# Patient Record
Sex: FEMALE | Race: WHITE | NOT HISPANIC OR LATINO
[De-identification: names, ages, dates, MRNs, and addresses within clinical notes are randomized per-mention and may not be internally consistent; named-entity substitution may affect disease eponyms.]

---

## 2018-12-23 ENCOUNTER — RX RENEWAL (OUTPATIENT)
Age: 83
End: 2018-12-23

## 2019-02-07 ENCOUNTER — RX RENEWAL (OUTPATIENT)
Age: 84
End: 2019-02-07

## 2019-02-11 ENCOUNTER — RX RENEWAL (OUTPATIENT)
Age: 84
End: 2019-02-11

## 2019-02-11 RX ORDER — ATORVASTATIN CALCIUM 10 MG/1
10 TABLET, FILM COATED ORAL
Qty: 90 | Refills: 0 | Status: ACTIVE | COMMUNITY
Start: 2019-02-07 | End: 1900-01-01

## 2019-04-03 ENCOUNTER — RX RENEWAL (OUTPATIENT)
Age: 84
End: 2019-04-03

## 2019-04-08 ENCOUNTER — RX CHANGE (OUTPATIENT)
Age: 84
End: 2019-04-08

## 2019-04-08 ENCOUNTER — RX RENEWAL (OUTPATIENT)
Age: 84
End: 2019-04-08

## 2019-06-05 ENCOUNTER — RECORD ABSTRACTING (OUTPATIENT)
Age: 84
End: 2019-06-05

## 2019-06-05 DIAGNOSIS — Z80.9 FAMILY HISTORY OF MALIGNANT NEOPLASM, UNSPECIFIED: ICD-10-CM

## 2019-06-05 DIAGNOSIS — G25.3 MYOCLONUS: ICD-10-CM

## 2019-06-05 DIAGNOSIS — Z80.0 FAMILY HISTORY OF MALIGNANT NEOPLASM OF DIGESTIVE ORGANS: ICD-10-CM

## 2019-06-05 DIAGNOSIS — Z86.79 PERSONAL HISTORY OF OTHER DISEASES OF THE CIRCULATORY SYSTEM: ICD-10-CM

## 2019-06-05 LAB — CYTOLOGY CVX/VAG DOC THIN PREP: NORMAL

## 2019-06-05 RX ORDER — VITAMIN E 268 MG
500 CAPSULE ORAL
Refills: 0 | Status: ACTIVE | COMMUNITY

## 2019-06-05 RX ORDER — CELECOXIB 100 MG/1
100 CAPSULE ORAL
Refills: 0 | Status: ACTIVE | COMMUNITY

## 2019-06-05 RX ORDER — MULTIVITAMIN
TABLET ORAL
Refills: 0 | Status: ACTIVE | COMMUNITY

## 2019-06-05 RX ORDER — ASPIRIN ENTERIC COATED TABLETS 81 MG 81 MG/1
81 TABLET, DELAYED RELEASE ORAL
Refills: 0 | Status: ACTIVE | COMMUNITY

## 2019-07-01 ENCOUNTER — RX RENEWAL (OUTPATIENT)
Age: 84
End: 2019-07-01

## 2019-07-02 ENCOUNTER — APPOINTMENT (OUTPATIENT)
Dept: INTERNAL MEDICINE | Facility: CLINIC | Age: 84
End: 2019-07-02

## 2019-07-11 ENCOUNTER — RX RENEWAL (OUTPATIENT)
Age: 84
End: 2019-07-11

## 2019-08-20 ENCOUNTER — APPOINTMENT (OUTPATIENT)
Dept: ENDOCRINOLOGY | Facility: CLINIC | Age: 84
End: 2019-08-20
Payer: MEDICARE

## 2019-08-20 VITALS
DIASTOLIC BLOOD PRESSURE: 60 MMHG | BODY MASS INDEX: 26.75 KG/M2 | HEIGHT: 63 IN | SYSTOLIC BLOOD PRESSURE: 142 MMHG | WEIGHT: 151 LBS

## 2019-08-20 DIAGNOSIS — E53.8 DEFICIENCY OF OTHER SPECIFIED B GROUP VITAMINS: ICD-10-CM

## 2019-08-20 DIAGNOSIS — E53.1 PYRIDOXINE DEFICIENCY: ICD-10-CM

## 2019-08-20 DIAGNOSIS — D64.9 ANEMIA, UNSPECIFIED: ICD-10-CM

## 2019-08-20 PROCEDURE — 36415 COLL VENOUS BLD VENIPUNCTURE: CPT

## 2019-08-20 PROCEDURE — 99214 OFFICE O/P EST MOD 30 MIN: CPT | Mod: 25

## 2019-08-20 RX ORDER — METFORMIN HYDROCHLORIDE 1000 MG/1
1000 TABLET, COATED ORAL
Qty: 180 | Refills: 3 | Status: DISCONTINUED | COMMUNITY
Start: 2019-07-01 | End: 2019-08-20

## 2019-08-21 LAB
ALBUMIN SERPL ELPH-MCNC: 4.6 G/DL
ALP BLD-CCNC: 85 U/L
ALT SERPL-CCNC: 11 U/L
ANION GAP SERPL CALC-SCNC: 18 MMOL/L
AST SERPL-CCNC: 16 U/L
BASOPHILS # BLD AUTO: 0.06 K/UL
BASOPHILS NFR BLD AUTO: 0.6 %
BILIRUB DIRECT SERPL-MCNC: 0.1 MG/DL
BILIRUB INDIRECT SERPL-MCNC: 0.2 MG/DL
BILIRUB SERPL-MCNC: 0.3 MG/DL
BUN SERPL-MCNC: 30 MG/DL
CALCIT SERPL-MCNC: <1 PG/ML
CALCIUM SERPL-MCNC: 10.4 MG/DL
CALCIUM SERPL-MCNC: 10.4 MG/DL
CHLORIDE SERPL-SCNC: 100 MMOL/L
CO2 SERPL-SCNC: 26 MMOL/L
CREAT SERPL-MCNC: 1.6 MG/DL
EOSINOPHIL # BLD AUTO: 0.17 K/UL
EOSINOPHIL NFR BLD AUTO: 1.7 %
ESTIMATED AVERAGE GLUCOSE: 137 MG/DL
GLUCOSE SERPL-MCNC: 166 MG/DL
HBA1C MFR BLD HPLC: 6.4 %
HCT VFR BLD CALC: 38.7 %
HGB BLD-MCNC: 12.2 G/DL
IMM GRANULOCYTES NFR BLD AUTO: 0.3 %
LYMPHOCYTES # BLD AUTO: 1.7 K/UL
LYMPHOCYTES NFR BLD AUTO: 17 %
MAGNESIUM SERPL-MCNC: 2.2 MG/DL
MAN DIFF?: NORMAL
MCHC RBC-ENTMCNC: 31.4 PG
MCHC RBC-ENTMCNC: 31.5 GM/DL
MCV RBC AUTO: 99.5 FL
MONOCYTES # BLD AUTO: 0.59 K/UL
MONOCYTES NFR BLD AUTO: 5.9 %
NEUTROPHILS # BLD AUTO: 7.43 K/UL
NEUTROPHILS NFR BLD AUTO: 74.5 %
PARATHYROID HORMONE INTACT: 31 PG/ML
PHOSPHATE SERPL-MCNC: 3.9 MG/DL
PLATELET # BLD AUTO: 252 K/UL
POTASSIUM SERPL-SCNC: 4.2 MMOL/L
PROT SERPL-MCNC: 7.5 G/DL
RBC # BLD: 3.89 M/UL
RBC # FLD: 13.5 %
SODIUM SERPL-SCNC: 144 MMOL/L
T3 SERPL-MCNC: 79 NG/DL
T4 SERPL-MCNC: 7.6 UG/DL
TSH SERPL-ACNC: 2.82 UIU/ML
VIT B12 SERPL-MCNC: 803 PG/ML
WBC # FLD AUTO: 9.98 K/UL

## 2019-08-21 NOTE — HISTORY OF PRESENT ILLNESS
[FreeTextEntry1] : August 20, 2019\par \par    PCP: Dr. Yolanda Villa  <- Dr. Amado Ng\par             Kidney - Dr. Nehemiah Hughes at [facility] for elev. creat.\par             Gyn: Dr. Fay\par             Card: Dr. George Dunn ([place]) \par             [phone] (Office)\par             [phone] (Fax)\par             Neuro: Dr. Crook for tremors 4 herniated discs \par             had MRI brain as well\par            .\par            CC: Silent thyroiditis - 2013 (now euthyroid)\par             12/13/2014 u/s [facility] - no nodule\par             Grand daughter developed thyroid cancer. - Medullary    \par             Type 2 diabetes A1c 6.7% ~ 2009\par             Elevated calcium - ?resolved off of supps\par             (elev. creatinine - Dr. 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 \par Left with requirement for low dose levothyroxine:  25 mcg daily.\par \par Visits today regarding diabetes.  \par Taking metformin 500 mg ER, two daily\par \par Recently underwent TAVR at [facility] very successfully.  \par \par Has a meter at home, but never uses it.  \par \par Impression:  Mild hypothyrodiism controlled.\par Labs today will show if BS also controlled.  \par \par \par Previous notes from [employer] appended below.\par \par \par    July 11, 2018\par            .\par            PCP: Dr. Amado gN\par             Kidney - Dr. Nehemiah Hughes at [facility] for elev. creat.\par             Gyn: Dr. Fay\par             Card: Dr. George Dunn ([place]) \par             [phone] (Office)\par             [phone] (Fax)\par             Neuro: Dr. Crook for tremors 4 herniated discs \par             had MRI brain as well\par            .\par            CC: Silent thyroiditis - 2013 (now euthyroid)\par             12/13/2014 u/[facility] - no nodule\par             Grand daughter developed thyroid cancer.\par             Type 2 diabetes A1c 6.7%\par             Elevated calcium - ?resolved off of supplements\par             (elev. creatinine - Dr. Hughes)\par             (osteoarthritis)\par            .\par            On Synthroid 25 mcg daily and\par            metformin 1000 mg daily (Dougie Rudolph asked if ER better)\par            .\par            Kindly brings labs ([place]) from\par            Dr. George Dunn/Nuno Banks) [facility] at [address] [place] [address] in . phone: [phone] which include:\par            .\par            glucose 115 mg/dl\par            creatinine 1.44\par            calcium 10.1\par            HbA1c 6.1 %\par            .\par            She developed SOB, switched from HCTZ to furosemide and attributes this to improvement of A1c down to 6.1%\par            She also feels much improved on the furosemide. \par            .\par            Impression: Diabetes transitioned to prediabetes.\par            A1c excellent.\par            .\par            .\par            ==\par            .\par            December 12, 2017\par            .\par            PCP: Dr. Amado Ng\par             Kidney - Dr. Nehemiah Hughes at [facility]\par             Gyn: Dr. Fay\par             Card: Dr. George Dunn ([place]) \par             [phone] (Office)\par             [phone] (Fax)\par            .\par            CC: Silent thyroiditis - 2013 (now euthyroid)\par             Grand daughter developed thyroid cancer.\par             Type 2 diabetes\par             Elevated calcium - ?resolved off of supps\par             (elev. creatinine - Dr. Hughes)\par             (osteoarthritis)\par            .\par            Last here about 2 1/2 hears ago.\par            Last available blood tests ([facility]) from\par            4/29/2015\par            include\par            \par            cret1.53 \par            L shoulder replacement.\par            Herniated disc cervical spine\par            Twitching "like Dr. Barbosa" \par            -> gabapentin with resolution of the twitching.\par            Her main symptom today is fatigue.\par            She reports that she has been told of early aortic stenosis.\par            Recently switched from HCTZ to furosemide b/o LE edema.\par            .\par            81 yo accompanied by her friend, Dougie.\par            They will be traveling to [place] in the near future. \par            .\par            Says A1c 6.3 %\par            .\par            Impression: Gr 2/6 KE. No edema.\par            Thyroid normal to palpation. Ultrasound thyroid at [facility]\par            12/13/2014 showed "No nodule...hertogeneous echotexture...." \par            .\par            Plan: Updated TFTs today.\par            Call here for results in one-two weeks.\par            ROV one year. \par            .\par            .\par            ==\par            .\par            May 19, 2015\par            .\par            PCP: Dr. Amado Ng\par             Kidney - Dr. Nehemiah Hughes at [facility]\par             Gyn: Dr. Fay\par             Card: Dr. George Dunn ([place]) \par             [phone] (Office)\par             [phone] (Fax)\par            .\par            CC: Silent thyroiditis - 2013\par             DM2\par             Elevated calcium\par             (elev. creatinine - Dr. Hughes)\par             (osteoarthritis)\par            .\par            Friends with Dougie Rudolph who helps with planting and building\par            furniture. Returns today b/o history of thyroiditis and diabetes.\par            .\par            Plans R total knee at [facility] and would like cardiology evaluation before that. Has recently noted some SCHRADER.\par            .\par            Also notes increased fatigue and twitching.\par            Recent [facility] tests show\par            \par            calcium 9,5 **** off of supp\par            BUN 30\par            creatinine 1.53 (last one was 1.23)\par            B12 40\par            TSH 2.85 - on levothyroxine 25 mcg daily\par            25 OH vit D 31 \par            .\par            Dec 2014 Ultrasound thyroid - OK\par            .\par            Impression: BS in good range, thyroid in good range.\par            Elevated calcium has resolved off of supplements.\par            .\par            Plan: I told her that I agreed that cardiology evaluation is\par            prudent before next knee surgery.\par            .\par            ROV October after labs.\par            .\par            ==\par            .\par            ==\par            Dec 16, 2014\par            PCP: Dr. Ng\par             Kidney - Dr. Nehemiah Hughes at [facility]\par             Gyn: Dr. Fay\par            .\par            CC: Silent thyroiditis - 2013\par             DM2\par             Elevated calcium\par             (elev. creatinine - Dr. Hughes)\par             (osteoarthritis)\par            .\par            Husb a [employer] vet. \par            Former , but has herniated disk\par            Daughter in law recently had CVA\par            .\par            # TSH in August on levothyroxine 25 mcg/d normal at 3.0\par            .\par            # Calcium on Dec 10 normal at 9.6\par             PTH 27\par             1,25 di OH vit D 77.6 (10-75) \par             25 OH vit D 32. \par             ACE 31\par             Immunofixation blood - neg\par            .\par            # DM2 - Hemoglobine A1c 6.2% \par             Urine microalbumin normal a 3.3\par              mg/dl\par             Has meter at home for testing, fasting and after meals. \par            .\par            # Recent creatinine 1.32\par            .\par            Impression: All in all, doing very well. Hypothyroidism controlled.\par            .\par            Plan: Keep f/u appt to ophto - Dr. Izaguirre. No longer needs colonsocopies. \par            .\par            .\par            ====\par            Oct 15, 2014\par            PCP: Dr. Ng\par             Nehemiah Hughes at [facility] Nephrologist\par            CC: DM2\par             Silent thyroiditis\par             Elevated calcium\par             (elev creatinine - saw Dr. Hughes)\par            .\par            Blood tests done by Dr. Ng on August 26 showed\par             mg/dl\par            BUN 26\par            creatinine 1.35\par            TSH 3.0\par            HbA1c 6.2%\par            calcium 9.8 alb 4.2\par            .\par            Remains on levothyroxine 25 mcg daily.\par            .\par            Impression: Reason for elevated calcium not clear. \par             followed at [facility] for kidney cancer, Hep C (after transfusion).\par            .\par            ==\par            April 22, 2014\par            PCP: Dr. Ng\par            CC: DM2, thyroiditis, elevated calcium\par            .\par            .\par            Enjoyedd [place]\par            Will go to [facility]\par            CC: today = fatigue\par            .\par            Impression: Fingerstick BS highest at 176 mg/dl\par            On metformin 500 mg, two in PM per Dr. Sanders\par            FBS at lab 112 mg/dl and A1c 6.2%\par            .\par            Impresssion: Thyroiditis resolved. BS good.\par            Plan: F/U ultrasound thyroid October.\par            Continue weekly fingerstick BS\par            F/U to Dr. Ng.\par            .\par            .=========\par            March 4, 2014\par            PCP: Dr. Ng\par            CC: DM2; thyroiditis; elevated calcium\par            .\par            Had a good time in [place] and she and I chatted while she was there.\par            .\par            # She reports BS in good range, but does not test very often. Encouraged her to test at least once a week.\par            .\par            # Hyperthyroidism associated with very low uptake on scan, followed by hypothyroidism. Most recent TFTs are now back to normal. Continued surveillance and a f/u ultrasound thyroid will be prudent.\par            .\par            # Labs also show some elevation of calcium and elevated albumin ?volume depleted at time of test. Will follow up on calcium, albumin, BUN, creatinine before next visit.\par            .\par            =====\par            October 30, 2013\par            PCP: Dr. Ng\par            CC: DM2, hyperthyroid\par            .\par            Thyroid scan shows no uptake. T4 14\par            Imp: Thyroiditis.\par            Plan: PRN propranol; TFTs every month until return visit (she will be in [place] for a while).\par            .\par            =========\par            Oct 23, 2013\par            PCP: Dr. Amado Ng\par            CC: DM2, hyperthyroid\par            .\par            On Lamasil for fungal infection of nail, per a relative and has been advised to have LFTs checked.\par            Dr. Sanders had started her on metformin, which she still takes.\par            Went for sonogram of thyroid, small nodule.\par            Thyroid scan was supposed to be performed today, but I-123 was not available.\par            Imp: May well have thyroiditis.\par            Plan: For shakiness, propranolol 5 mg BID prn.\par            Scan rescheduled and ROV after that.\par            Continue to monitor some PC BS.\par            .\par            ========\par            October 17, 2013\par            PCP: Dr. 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 \par            # 2009 lost weight and was diagnosed with diabetes. Now takes metformin 1000 mg daily. Did well until April and Dr. Ng found that TSH was elevated. Now very fatigued. Sleeps more. \par            Saw Dr. Hughes at - for elevated creat\par            FHx thyroid cancer.\par            Imp: Went from hypo to hyperthyroid after L-thyroxine.\par            Plan: Labs/scan/sono ROV 1 week.

## 2019-08-25 LAB — VIT B6 SERPL-MCNC: 36.5 UG/L

## 2019-11-08 ENCOUNTER — APPOINTMENT (OUTPATIENT)
Dept: INTERNAL MEDICINE | Facility: CLINIC | Age: 84
End: 2019-11-08
Payer: MEDICARE

## 2019-11-08 VITALS
WEIGHT: 158 LBS | BODY MASS INDEX: 28 KG/M2 | HEART RATE: 68 BPM | DIASTOLIC BLOOD PRESSURE: 60 MMHG | TEMPERATURE: 98.7 F | SYSTOLIC BLOOD PRESSURE: 136 MMHG | HEIGHT: 63 IN

## 2019-11-08 DIAGNOSIS — M19.90 UNSPECIFIED OSTEOARTHRITIS, UNSPECIFIED SITE: ICD-10-CM

## 2019-11-08 DIAGNOSIS — Z86.39 PERSONAL HISTORY OF OTHER ENDOCRINE, NUTRITIONAL AND METABOLIC DISEASE: ICD-10-CM

## 2019-11-08 DIAGNOSIS — E78.5 HYPERLIPIDEMIA, UNSPECIFIED: ICD-10-CM

## 2019-11-08 PROCEDURE — G0439: CPT

## 2019-11-08 PROCEDURE — 90662 IIV NO PRSV INCREASED AG IM: CPT

## 2019-11-08 PROCEDURE — G0008: CPT

## 2019-11-08 RX ORDER — LEVOTHYROXINE SODIUM 25 UG/1
25 TABLET ORAL DAILY
Qty: 90 | Refills: 0 | Status: DISCONTINUED | COMMUNITY
Start: 2019-04-03 | End: 2019-11-08

## 2019-11-08 RX ORDER — PRIMIDONE 50 MG/1
50 TABLET ORAL
Refills: 0 | Status: DISCONTINUED | COMMUNITY
End: 2019-11-08

## 2019-11-08 RX ORDER — HYDROCHLOROTHIAZIDE AND TRIAMTERENE 25; 37.5 MG/1; MG/1
37.5-25 CAPSULE ORAL
Refills: 0 | Status: DISCONTINUED | COMMUNITY
End: 2019-11-08

## 2019-11-08 RX ORDER — SIMVASTATIN 10 MG/1
10 TABLET, FILM COATED ORAL
Refills: 0 | Status: DISCONTINUED | COMMUNITY
End: 2019-11-08

## 2019-11-08 RX ORDER — TRIAMTERENE AND HYDROCHLOROTHIAZIDE 25; 37.5 MG/1; MG/1
37.5-25 TABLET ORAL
Refills: 0 | Status: DISCONTINUED | COMMUNITY
End: 2019-11-08

## 2019-11-08 RX ORDER — LISINOPRIL 5 MG/1
5 TABLET ORAL
Refills: 0 | Status: DISCONTINUED | COMMUNITY
End: 2019-11-08

## 2019-11-08 RX ORDER — HYDROCODONE BITARTRATE AND ACETAMINOPHEN 5; 300 MG/1; MG/1
TABLET ORAL
Refills: 0 | Status: DISCONTINUED | COMMUNITY
End: 2019-11-08

## 2019-11-08 RX ORDER — LOSARTAN POTASSIUM 50 MG/1
50 TABLET, FILM COATED ORAL
Refills: 0 | Status: DISCONTINUED | COMMUNITY
End: 2019-11-08

## 2019-11-08 NOTE — PHYSICAL EXAM
[Normal] : normal gait, coordination grossly intact, no focal deficits [de-identified] : bilat normal, no lumps [de-identified] : arthritis changes [de-identified] : poor turgor L arm ertythema normal temp , no d/c

## 2019-11-08 NOTE — HEALTH RISK ASSESSMENT
[Very Good] : ~his/her~  mood as very good [No falls in past year] : Patient reported no falls in the past year [No] : No [0] : 2) Feeling down, depressed, or hopeless: Not at all (0) [Patient reported bone density results were normal] : Patient reported bone density results were normal [Patient reported mammogram was normal] : Patient reported mammogram was normal [With Family] : lives with family [Retired] : retired [] :  [High School] : high school [Feels Safe at Home] : Feels safe at home [# Of Children ___] : has [unfilled] children [With Patient/Caregiver] : With Patient/Caregiver [Name: ___] : Health Care Proxy's Name: [unfilled]  [Designated Healthcare Proxy] : Designated healthcare proxy [Relationship: ___] : Relationship: [unfilled] [de-identified] : shekhar [] : No [[id number]] : 0 [MammogramDate] : 01/18 [BoneDensityDate] : 01/18 [AdvancecareDate] : 11/8/19

## 2019-11-08 NOTE — REVIEW OF SYSTEMS
[Negative] : Heme/Lymph [Hearing Loss] : hearing loss [Chest Pain] : no chest pain [Palpitations] : no palpitations [Lower Ext Edema] : no lower extremity edema [Orthopnea] : no orthopnea [FreeTextEntry4] : hearing aids [de-identified] : L arm redness x few days

## 2019-11-20 LAB
ALBUMIN SERPL ELPH-MCNC: 4.2 G/DL
ALP BLD-CCNC: 71 U/L
ALT SERPL-CCNC: 11 U/L
ANION GAP SERPL CALC-SCNC: 16 MMOL/L
APPEARANCE: CLEAR
AST SERPL-CCNC: 14 U/L
BASOPHILS # BLD AUTO: 0.07 K/UL
BASOPHILS NFR BLD AUTO: 0.9 %
BILIRUB SERPL-MCNC: 0.3 MG/DL
BILIRUBIN URINE: NEGATIVE
BLOOD URINE: NEGATIVE
BUN SERPL-MCNC: 24 MG/DL
CALCIUM SERPL-MCNC: 9.8 MG/DL
CHLORIDE SERPL-SCNC: 103 MMOL/L
CHOLEST SERPL-MCNC: 184 MG/DL
CHOLEST/HDLC SERPL: 2.4 RATIO
CO2 SERPL-SCNC: 26 MMOL/L
COLOR: NORMAL
CREAT SERPL-MCNC: 1.52 MG/DL
EOSINOPHIL # BLD AUTO: 0.17 K/UL
EOSINOPHIL NFR BLD AUTO: 2.3 %
ESTIMATED AVERAGE GLUCOSE: 140 MG/DL
FOLATE SERPL-MCNC: 18.3 NG/ML
GLUCOSE QUALITATIVE U: NEGATIVE
GLUCOSE SERPL-MCNC: 122 MG/DL
HBA1C MFR BLD HPLC: 6.5 %
HCT VFR BLD CALC: 35.6 %
HDLC SERPL-MCNC: 76 MG/DL
HGB BLD-MCNC: 11.4 G/DL
IMM GRANULOCYTES NFR BLD AUTO: 0.1 %
KETONES URINE: NEGATIVE
LDLC SERPL CALC-MCNC: 82 MG/DL
LEUKOCYTE ESTERASE URINE: NEGATIVE
LYMPHOCYTES # BLD AUTO: 1.69 K/UL
LYMPHOCYTES NFR BLD AUTO: 22.9 %
MAN DIFF?: NORMAL
MCHC RBC-ENTMCNC: 31.6 PG
MCHC RBC-ENTMCNC: 32 GM/DL
MCV RBC AUTO: 98.6 FL
MONOCYTES # BLD AUTO: 0.55 K/UL
MONOCYTES NFR BLD AUTO: 7.5 %
NEUTROPHILS # BLD AUTO: 4.88 K/UL
NEUTROPHILS NFR BLD AUTO: 66.3 %
NITRITE URINE: NEGATIVE
PH URINE: 6.5
PLATELET # BLD AUTO: 263 K/UL
POTASSIUM SERPL-SCNC: 4.9 MMOL/L
PROT SERPL-MCNC: 6.8 G/DL
PROTEIN URINE: NEGATIVE
RBC # BLD: 3.61 M/UL
RBC # FLD: 12.8 %
SODIUM SERPL-SCNC: 145 MMOL/L
SPECIFIC GRAVITY URINE: 1.01
T4 FREE SERPL-MCNC: 1.3 NG/DL
TRIGL SERPL-MCNC: 129 MG/DL
TSH SERPL-ACNC: 2.99 UIU/ML
UROBILINOGEN URINE: NORMAL
VIT B12 SERPL-MCNC: 509 PG/ML
WBC # FLD AUTO: 7.37 K/UL

## 2019-11-26 RX ORDER — LOSARTAN POTASSIUM 50 MG/1
50 TABLET, FILM COATED ORAL DAILY
Qty: 90 | Refills: 0 | Status: ACTIVE | COMMUNITY

## 2019-11-26 RX ORDER — LOSARTAN POTASSIUM 100 MG/1
100 TABLET, FILM COATED ORAL
Qty: 90 | Refills: 0 | Status: DISCONTINUED | COMMUNITY
Start: 2019-02-07 | End: 2019-11-26

## 2019-11-26 RX ORDER — AMLODIPINE BESYLATE 5 MG/1
5 TABLET ORAL DAILY
Qty: 90 | Refills: 1 | Status: ACTIVE | COMMUNITY

## 2019-12-09 DIAGNOSIS — R79.89 OTHER SPECIFIED ABNORMAL FINDINGS OF BLOOD CHEMISTRY: ICD-10-CM

## 2019-12-11 ENCOUNTER — APPOINTMENT (OUTPATIENT)
Dept: INTERNAL MEDICINE | Facility: CLINIC | Age: 84
End: 2019-12-11

## 2019-12-17 LAB
ALBUMIN SERPL ELPH-MCNC: 4.2 G/DL
ALP BLD-CCNC: 85 U/L
ALT SERPL-CCNC: 13 U/L
ANION GAP SERPL CALC-SCNC: 14 MMOL/L
AST SERPL-CCNC: 14 U/L
BILIRUB SERPL-MCNC: 0.3 MG/DL
BUN SERPL-MCNC: 28 MG/DL
CALCIUM SERPL-MCNC: 10 MG/DL
CHLORIDE SERPL-SCNC: 104 MMOL/L
CO2 SERPL-SCNC: 27 MMOL/L
CREAT SERPL-MCNC: 1.58 MG/DL
GLUCOSE SERPL-MCNC: 137 MG/DL
POTASSIUM SERPL-SCNC: 4.7 MMOL/L
PROT SERPL-MCNC: 7 G/DL
SODIUM SERPL-SCNC: 145 MMOL/L

## 2020-02-05 ENCOUNTER — RX RENEWAL (OUTPATIENT)
Age: 85
End: 2020-02-05

## 2020-02-05 RX ORDER — FUROSEMIDE 40 MG/1
40 TABLET ORAL DAILY
Qty: 90 | Refills: 0 | Status: ACTIVE | COMMUNITY
Start: 2019-02-07 | End: 1900-01-01

## 2020-09-24 ENCOUNTER — APPOINTMENT (OUTPATIENT)
Dept: INTERNAL MEDICINE | Facility: CLINIC | Age: 85
End: 2020-09-24
Payer: MEDICARE

## 2020-09-24 VITALS — DIASTOLIC BLOOD PRESSURE: 66 MMHG | SYSTOLIC BLOOD PRESSURE: 120 MMHG

## 2020-09-24 VITALS
WEIGHT: 147 LBS | HEIGHT: 63 IN | SYSTOLIC BLOOD PRESSURE: 140 MMHG | DIASTOLIC BLOOD PRESSURE: 68 MMHG | HEART RATE: 68 BPM | TEMPERATURE: 98 F | BODY MASS INDEX: 26.05 KG/M2

## 2020-09-24 DIAGNOSIS — Z92.29 PERSONAL HISTORY OF OTHER DRUG THERAPY: ICD-10-CM

## 2020-09-24 PROCEDURE — 99213 OFFICE O/P EST LOW 20 MIN: CPT | Mod: 25

## 2020-09-24 PROCEDURE — 90662 IIV NO PRSV INCREASED AG IM: CPT

## 2020-09-24 PROCEDURE — G0008: CPT

## 2020-09-24 RX ORDER — GABAPENTIN 100 MG/1
100 CAPSULE ORAL AT BEDTIME
Qty: 30 | Refills: 5 | Status: ACTIVE | COMMUNITY
Start: 1900-01-01 | End: 1900-01-01

## 2020-09-24 NOTE — HISTORY OF PRESENT ILLNESS
[de-identified] : here for medical f/u ., returned form [place] for good, feels well needs FLu shot

## 2021-01-06 ENCOUNTER — APPOINTMENT (OUTPATIENT)
Dept: INTERNAL MEDICINE | Facility: CLINIC | Age: 86
End: 2021-01-06

## 2021-10-20 ENCOUNTER — RESULT REVIEW (OUTPATIENT)
Age: 86
End: 2021-10-20

## 2021-10-22 ENCOUNTER — APPOINTMENT (OUTPATIENT)
Dept: INTERNAL MEDICINE | Facility: CLINIC | Age: 86
End: 2021-10-22
Payer: MEDICARE

## 2021-10-22 VITALS
SYSTOLIC BLOOD PRESSURE: 132 MMHG | TEMPERATURE: 98.3 F | HEART RATE: 84 BPM | DIASTOLIC BLOOD PRESSURE: 60 MMHG | HEIGHT: 63 IN | WEIGHT: 137 LBS | BODY MASS INDEX: 24.27 KG/M2

## 2021-10-22 DIAGNOSIS — E11.9 TYPE 2 DIABETES MELLITUS W/OUT COMPLICATIONS: ICD-10-CM

## 2021-10-22 DIAGNOSIS — M81.0 AGE-RELATED OSTEOPOROSIS W/OUT CURRENT PATHOLOGICAL FRACTURE: ICD-10-CM

## 2021-10-22 DIAGNOSIS — Z92.29 PERSONAL HISTORY OF OTHER DRUG THERAPY: ICD-10-CM

## 2021-10-22 DIAGNOSIS — E78.2 MIXED HYPERLIPIDEMIA: ICD-10-CM

## 2021-10-22 DIAGNOSIS — Z23 ENCOUNTER FOR IMMUNIZATION: ICD-10-CM

## 2021-10-22 DIAGNOSIS — I10 ESSENTIAL (PRIMARY) HYPERTENSION: ICD-10-CM

## 2021-10-22 DIAGNOSIS — N18.30 CHRONIC KIDNEY DISEASE, STAGE 3 UNSPECIFIED: ICD-10-CM

## 2021-10-22 DIAGNOSIS — Z00.00 ENCOUNTER FOR GENERAL ADULT MEDICAL EXAMINATION W/OUT ABNORMAL FINDINGS: ICD-10-CM

## 2021-10-22 PROCEDURE — G0439: CPT

## 2021-10-22 PROCEDURE — 36415 COLL VENOUS BLD VENIPUNCTURE: CPT

## 2021-10-22 PROCEDURE — G0008: CPT

## 2021-10-22 PROCEDURE — 99214 OFFICE O/P EST MOD 30 MIN: CPT | Mod: 25

## 2021-10-22 PROCEDURE — 90662 IIV NO PRSV INCREASED AG IM: CPT

## 2021-10-22 RX ORDER — CLINDAMYCIN HYDROCHLORIDE 150 MG/1
150 CAPSULE ORAL
Qty: 8 | Refills: 0 | Status: DISCONTINUED | COMMUNITY
Start: 2020-09-24 | End: 2021-10-22

## 2021-10-22 RX ORDER — CALCIUM CITRATE/VITAMIN D3 200MG-6.25
TABLET ORAL
Refills: 0 | Status: DISCONTINUED | COMMUNITY
End: 2021-10-22

## 2021-10-22 RX ORDER — METRONIDAZOLE 7.5 MG/G
0.75 GEL VAGINAL
Refills: 0 | Status: DISCONTINUED | COMMUNITY
End: 2021-10-22

## 2021-10-22 NOTE — HEALTH RISK ASSESSMENT
[Good] : ~his/her~  mood as  good [No] : No [0] : 2) Feeling down, depressed, or hopeless: Not at all (0) [PHQ-2 Negative - No further assessment needed] : PHQ-2 Negative - No further assessment needed [] : No [[id number]] : 0

## 2021-10-22 NOTE — PHYSICAL EXAM
[Normal] : normal gait, coordination grossly intact, no focal deficits [de-identified] : bilat normal, no lumps [de-identified] : arthritis changes [de-identified] : poor turgor L upper arm erythema , nontender

## 2021-10-25 LAB
ALBUMIN SERPL ELPH-MCNC: 4.5 G/DL
ALP BLD-CCNC: 91 U/L
ALT SERPL-CCNC: 13 U/L
ANION GAP SERPL CALC-SCNC: 18 MMOL/L
APPEARANCE: CLEAR
AST SERPL-CCNC: 15 U/L
BILIRUB SERPL-MCNC: 0.2 MG/DL
BILIRUBIN URINE: NEGATIVE
BLOOD URINE: NEGATIVE
BUN SERPL-MCNC: 26 MG/DL
CALCIUM SERPL-MCNC: 9.9 MG/DL
CHLORIDE SERPL-SCNC: 102 MMOL/L
CO2 SERPL-SCNC: 24 MMOL/L
COLOR: NORMAL
CREAT SERPL-MCNC: 1.49 MG/DL
CREAT SPEC-SCNC: 90 MG/DL
CREAT/PROT UR: 0.1 RATIO
FOLATE SERPL-MCNC: >20 NG/ML
GLUCOSE QUALITATIVE U: NEGATIVE
GLUCOSE SERPL-MCNC: 124 MG/DL
KETONES URINE: NEGATIVE
LEUKOCYTE ESTERASE URINE: NEGATIVE
NITRITE URINE: NEGATIVE
PH URINE: 6
POTASSIUM SERPL-SCNC: 3.9 MMOL/L
PROT SERPL-MCNC: 7.3 G/DL
PROT UR-MCNC: 8 MG/DL
PROTEIN URINE: NEGATIVE
SODIUM SERPL-SCNC: 144 MMOL/L
SPECIFIC GRAVITY URINE: 1.02
UROBILINOGEN URINE: NORMAL
VIT B12 SERPL-MCNC: 736 PG/ML

## 2022-04-20 ENCOUNTER — APPOINTMENT (OUTPATIENT)
Dept: ENDOCRINOLOGY | Facility: CLINIC | Age: 87
End: 2022-04-20
Payer: MEDICARE

## 2022-04-20 VITALS
WEIGHT: 139 LBS | HEART RATE: 100 BPM | DIASTOLIC BLOOD PRESSURE: 60 MMHG | BODY MASS INDEX: 24.63 KG/M2 | HEIGHT: 63 IN | OXYGEN SATURATION: 95 % | SYSTOLIC BLOOD PRESSURE: 135 MMHG

## 2022-04-20 DIAGNOSIS — E55.9 VITAMIN D DEFICIENCY, UNSPECIFIED: ICD-10-CM

## 2022-04-20 DIAGNOSIS — E03.9 HYPOTHYROIDISM, UNSPECIFIED: ICD-10-CM

## 2022-04-20 DIAGNOSIS — E21.3 HYPERPARATHYROIDISM, UNSPECIFIED: ICD-10-CM

## 2022-04-20 PROCEDURE — 36415 COLL VENOUS BLD VENIPUNCTURE: CPT

## 2022-04-20 PROCEDURE — 99214 OFFICE O/P EST MOD 30 MIN: CPT | Mod: 25

## 2022-04-20 RX ORDER — LEVOTHYROXINE SODIUM 0.03 MG/1
25 TABLET ORAL
Qty: 90 | Refills: 3 | Status: ACTIVE | COMMUNITY
Start: 2019-02-11 | End: 1900-01-01

## 2022-04-20 RX ORDER — METFORMIN HYDROCHLORIDE 500 MG/1
500 TABLET, COATED ORAL TWICE DAILY
Qty: 90 | Refills: 3 | Status: ACTIVE | COMMUNITY
Start: 2018-12-23 | End: 1900-01-01

## 2022-04-20 NOTE — HISTORY OF PRESENT ILLNESS
[FreeTextEntry1] : Apr 20, 2022      in person\par \par  PCP: Dr. Yolanda Villa  <- Dr. Amado Ng\par             Kidney - Dr. Nehemiah Hughes at [facility] for elev. creat.\par             Gyn: Dr. Fay\par             Card: Dr. George Dunn ([place]) -> Rebecca Giles [facility] in [place]:   [phone] (Office)   [phone] (Fax)\par             Neuro: Dr. Crook for tremors 4 herniated discs      had MRI brain as well\par             Ortho:  Dr. Zaidi   for L achilles tendonitis 2/2022    \par             Eyes:  Dr. Izaguirre ->  Dr. Valenzuela in [place]    floaters\par            .\par            CC: Silent thyroiditis - 2013 ->mild hypothyroidism (remote Hx Hashimoto's as well)\par             12/13/2014 u/s [facility] - no nodule\par             Grand daughter developed thyroid cancer. - Medullary    \par             Type 2 diabetes A1c 6.7% ~ 2009 4/2022: A1c 6.0%\par             Elevated calcium - ?resolved off of supps\par             (elev. creatinine - Dr. 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 \par Left with requirement for low dose levothyroxine:  25 mcg daily.\par \par 87 yo.\par Her  passed away in August 2021 and she lost weight and by 4/2022 A1c at office of Dr. Giles was 6.0% \par creatinine 1.38 * \par \par : :\par Constitutional:  Alert, well nourished, healthy appearance, no acute distress \par Eyes:  No proptosis, no stare\par Thyroid:  Normal to palpation\par Pulmonary:  No respiratory distress, no accessory muscle use; normal rate and effort\par Cardiac:  Normal rate\par Vascular: \par Endocrine:  No stigmata of Cushing’s Syndrome\par Musculoskeletal:  Normal gait, no involuntary movements\par Neurology:  No tremors\par Affect/Mood/Psych:  Oriented x 3; normal affect, normal insight/judgement, normal mood \par \par Imp:   She needs special sneakers for achilles tendonits and she will discuss with Dr. Zaidi\par A1c indicates diabetes is under excellent control.\par \par Plan:  TFTs.\par LT4 and metformin renewed.\par ROV ~8 months \par .\par \par \par \par \par \par \par August 20, 2019\par \par    PCP: Dr. Yolanda Villa  <- Dr. Amado Ng\par             Kidney - Dr. Nehemiah Hughes at [facility] for elev. creat.\par             Gyn: Dr. Fay\par             Card: Dr. George Dunn ([place]) \par             [phone] (Office)\par             [phone] (Fax)\par             Neuro: Dr. Crook for tremors 4 herniated discs \par             had MRI brain as well\par            .\par            CC: Silent thyroiditis - 2013 (now euthyroid)\par             12/13/2014 [facility] - no nodule\par             Grand daughter developed thyroid cancer. - Medullary    \par             Type 2 diabetes A1c 6.7% ~ 2009\par             Elevated calcium - ?resolved off of supps\par             (elev. creatinine - Dr. 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 \par Left with requirement for low dose levothyroxine:  25 mcg daily.\par \par Visits today regarding diabetes.  \par Taking metformin 500 mg ER, two daily\par \par Recently underwent TAVR at [facility] very successfully.  \par \par Has a meter at home, but never uses it.  \par \par Impression:  Mild hypothyrodiism controlled.\par Labs today will show if BS also controlled.  \par \par \par Previous notes from [employer] appended below.\par \par \par    July 11, 2018\par            .\par            PCP: Dr. Amado Ng\par             Kidney - Dr. Nehemiah Hughes at [facility] for elev. creat.\par             Gyn: Dr. Fay\par             Card: Dr. George Dunn ([place]) \par             [phone] (Office)\par             [phone] (Fax)\par             Neuro: Dr. Crook for tremors 4 herniated discs \par             had MRI brain as well\par            .\par            CC: Silent thyroiditis - 2013 (now euthyroid)\par             12/13/2014 [facility] - no nodule\par             Grand daughter developed thyroid cancer.\par             Type 2 diabetes A1c 6.7%\par             Elevated calcium - ?resolved off of supplements\par             (elev. creatinine - Dr. Hughes)\par             (osteoarthritis)\par            .\par            On Synthroid 25 mcg daily and\par            metformin 1000 mg daily (Dougie Rudolph asked if ER better)\par            .\par            Kindly brings labs ([place]) from\par            Dr. George Dunn/(Nico Banks) [facility] at [address] [place] [address] in . phone: [phone] which include:\par            .\par            glucose 115 mg/dl\par            creatinine 1.44\par            calcium 10.1\par            HbA1c 6.1 %\par            .\par            She developed SOB, switched from HCTZ to furosemide and attributes this to improvement of A1c down to 6.1%\par            She also feels much improved on the furosemide. \par            .\par            Impression: Diabetes transitioned to prediabetes.\par            A1c excellent.\par            .\par            .\par            ==\par            .\par            December 12, 2017\par            .\par            PCP: Dr. Amado Ng\par             Kidney - Dr. Nehemiah Hughes at [facility]\par             Gyn: Dr. Fay\par             Card: Dr. George Dunn ([place]) \par             [phone] (Office)\par             [phone] (Fax)\par            .\par            CC: Silent thyroiditis - 2013 (now euthyroid)\par             Grand daughter developed thyroid cancer.\par             Type 2 diabetes\par             Elevated calcium - ?resolved off of supps\par             (elev. creatinine - Dr. Hughes)\par             (osteoarthritis)\par            .\par            Last here about 2 1/2 hears ago.\par            Last available blood tests ([facility]) from\par            4/29/2015\par            include\par            \par            cret1.53 \par            L shoulder replacement.\par            Herniated disc cervical spine\par            Twitching "like Dr. Barbosa" \par            -> gabapentin with resolution of the twitching.\par            Her main symptom today is fatigue.\par            She reports that she has been told of early aortic stenosis.\par            Recently switched from HCTZ to furosemide b/o LE edema.\par            .\par            83 yo accompanied by her friend, Dougie.\par            They will be traveling to [place] in the near future. \par            .\par            Says A1c 6.3 %\par            .\par            Impression: Gr 2/6 KE. No edema.\par            Thyroid normal to palpation. Ultrasound thyroid at [facility]\par            12/13/2014 showed "No nodule...hertogeneous echotexture...." \par            .\par            Plan: Updated TFTs today.\par            Call here for results in one-two weeks.\par            ROV one year. \par            .\par            .\par            ==\par            .\par            May 19, 2015\par            .\par            PCP: Dr. Amado Ng\par             Kidney - Dr. Nehemiah Hughes at [facility]\par             Gyn: Dr. Fay\par             Card: Dr. George Dunn ([place]) \par             [phone] (Office)\par             [phone] (Fax)\par            .\par            CC: Silent thyroiditis - 2013\par             DM2\par             Elevated calcium\par             (elev. creatinine - Dr. 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 Returns today b/o history of thyroiditis and diabetes.\par            .\par            Plans R total knee at [facility] and would like cardiology evaluation before that. Has recently noted some SCHRADER.\par            .\par            Also notes increased fatigue and twitching.\par            Recent [facility] tests show\par            \par            calcium 9,5 **** off of supp\par            BUN 30\par            creatinine 1.53 (last one was 1.23)\par            B12 40\par            TSH 2.85 - on levothyroxine 25 mcg daily\par            25 OH vit D 31 \par            .\par            Dec 2014 Ultrasound thyroid - OK\par            .\par            Impression: BS in good range, thyroid in good range.\par            Elevated calcium has resolved off of supplements.\par            .\par            Plan: I told her that I agreed that cardiology evaluation is\par            prudent before next knee surgery.\par            .\par            ROV October after labs.\par            .\par            ==\par            .\par            ==\par            Dec 16, 2014\par            PCP: Dr. Ng\par             Kidney - Dr. Nehemiah Hughes at [facility]\par             Gyn: Dr. Fay\par            .\par            CC: Silent thyroiditis - 2013\par             DM2\par             Elevated calcium\par             (elev. creatinine - Dr. Hughes)\par             (osteoarthritis)\par            .\par            Husb a [place] [employer] vet. \par            Former , but has herniated disk\par            Daughter in law recently had CVA\par            .\par            # TSH in August on levothyroxine 25 mcg/d normal at 3.0\par            .\par            # Calcium on Dec 10 normal at 9.6\par             PTH 27\par             1,25 di OH vit D 77.6 (10-75) \par             25 OH vit D 32. \par             ACE 31\par             Immunofixation blood - neg\par            .\par            # DM2 - Hemoglobine A1c 6.2% \par             Urine microalbumin normal a 3.3\par              mg/dl\par             Has meter at home for testing, fasting and after meals. \par            .\par            # Recent creatinine 1.32\par            .\par            Impression: All in all, doing very well. Hypothyroidism controlled.\par            .\par            Plan: Keep f/u appt to ophto - Dr. Izaguirre. No longer needs colonsocopies. \par            .\par            .\par            ====\par            Oct 15, 2014\par            PCP: Dr. Ng\par             Nehemiah Hughes at [facility]\par            CC: DM2\par             Silent thyroiditis\par             Elevated calcium\par             (elev creatinine - saw Dr. Hughes)\par            .\par            Blood tests done by Dr. Ng on August 26 showed\par             mg/dl\par            BUN 26\par            creatinine 1.35\par            TSH 3.0\par            HbA1c 6.2%\par            calcium 9.8 alb 4.2\par            .\par            Remains on levothyroxine 25 mcg daily.\par            .\par            Impression: Reason for elevated calcium not clear. \par             followed at [facility] for kidney cancer, Hep C (after transfusion).\par            .\par            ==\par            April 22, 2014\par            PCP: Dr. Ng\par            CC: DM2, thyroiditis, elevated calcium\par            .\par            .\par            Enjoyedd [place]\par            Will go to [facility]\par            CC: today = fatigue\par            .\par            Impression: Fingerstick BS highest at 176 mg/dl\par            On metformin 500 mg, two in PM per Dr. Sanders\par            FBS at lab 112 mg/dl and A1c 6.2%\par            .\par            Impresssion: Thyroiditis resolved. BS good.\par            Plan: F/U ultrasound thyroid October.\par            Continue weekly fingerstick BS\par            F/U to Dr. Ng.\par            .\par            .=========\par            March 4, 2014\par            PCP: Dr. Ng\par            CC: DM2; thyroiditis; elevated calcium\par            .\par            Had a good time in [place] and she and I chatted while she was there.\par            .\par            # She reports BS in good range, but does not test very often. Encouraged her to test at least once a week.\par            .\par            # Hyperthyroidism associated with very low uptake on scan, followed by hypothyroidism. Most recent TFTs are now back to normal. Continued surveillance and a f/u ultrasound thyroid will be prudent.\par            .\par            # Labs also show some elevation of calcium and elevated albumin ?volume depleted at time of test. Will follow up on calcium, albumin, BUN, creatinine before next visit.\par            .\par            =====\par            October 30, 2013\par            PCP: Dr. Ng\par            CC: DM2, hyperthyroid\par            .\par            Thyroid scan shows no uptake. T4 14\par            Imp: Thyroiditis.\par            Plan: PRN propranol; TFTs every month until return visit (she will be in [place] for a while).\par            .\par            =========\par            Oct 23, 2013\par            PCP: Dr. Amado Ng\par            CC: DM2, hyperthyroid\par            .\par            On Lamasil for fungal infection of nail, per a relative and has been advised to have LFTs checked.\par            Dr. Sanders had started her on metformin, which she still takes.\par            Went for sonogram of thyroid, small nodule.\par            Thyroid scan was supposed to be performed today, but I-123 was not available.\par            Imp: May well have thyroiditis.\par            Plan: For shakiness, propranolol 5 mg BID prn.\par            Scan rescheduled and ROV after that.\par            Continue to monitor some PC BS.\par            .\par            ========\par            October 17, 2013\par            PCP: Dr. 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 \par            # 2009 lost weight and was diagnosed with diabetes. Now takes metformin 1000 mg daily. Did well until April and Dr. Ng found that TSH was elevated. Now very fatigued. Sleeps more. \par            Saw Dr. Hughes at [facility] for elevated creat\par            FHx thyroid cancer.\par            Imp: Went from hypo to hyperthyroid after L-thyroxine.\par            Plan: Labs/scan/sono ROV 1 week.

## 2022-05-16 LAB
25(OH)D3 SERPL-MCNC: 43.1 NG/ML
CA-I SERPL-SCNC: 5.2 MG/DL
CALCIUM SERPL-MCNC: 10.3 MG/DL
CHOLEST SERPL-MCNC: 201 MG/DL
HDLC SERPL-MCNC: 84 MG/DL
LDLC SERPL CALC-MCNC: 95 MG/DL
NONHDLC SERPL-MCNC: 118 MG/DL
PARATHYROID HORMONE INTACT: 45 PG/ML
PHOSPHATE SERPL-MCNC: 3.9 MG/DL
T4 SERPL-MCNC: 9.2 UG/DL
THYROGLOB AB SERPL-ACNC: <20 IU/ML
THYROPEROXIDASE AB SERPL IA-ACNC: 18.4 IU/ML
TRIGL SERPL-MCNC: 112 MG/DL
TSH SERPL-ACNC: 3.57 UIU/ML

## 2023-12-04 ENCOUNTER — APPOINTMENT (OUTPATIENT)
Dept: PHYSICAL MEDICINE AND REHAB | Facility: CLINIC | Age: 88
End: 2023-12-04
Payer: MEDICARE

## 2023-12-04 ENCOUNTER — NON-APPOINTMENT (OUTPATIENT)
Age: 88
End: 2023-12-04

## 2023-12-04 PROCEDURE — 20611 DRAIN/INJ JOINT/BURSA W/US: CPT | Mod: LT

## 2023-12-04 PROCEDURE — 99204 OFFICE O/P NEW MOD 45 MIN: CPT | Mod: 25

## 2023-12-20 ENCOUNTER — APPOINTMENT (OUTPATIENT)
Dept: PHYSICAL MEDICINE AND REHAB | Facility: CLINIC | Age: 88
End: 2023-12-20
Payer: MEDICARE

## 2023-12-20 PROCEDURE — 99441: CPT | Mod: 95

## 2023-12-20 NOTE — HISTORY OF PRESENT ILLNESS
[FreeTextEntry1] : Televisit encounter Patient consented to be evaluated via televisit today using audio Present during today's encounter:  PRAVIN MORRISON and myself Patient was located at home. I was located at: [address] [place], [place] [id number]  PRAVIN MORRISON is here for follow up. Since last visit she has been doing PT. She is taking Gabapentin 200mg QHS PO and is asking for refill. It has been helping her pains.   Denies any new pain, numbness or weakness, bowel/bladder dysfunction, saddle anesthesia, fevers, chills, weight loss, night pain, or night sweats at this time.

## 2023-12-20 NOTE — ASSESSMENT
[FreeTextEntry1] : Ms. PRAVIN MORRISON is a 88 year F with pain in the lower back on the LEFT with radiation down the leg to the THIGH and knee. She reports chronic long standing pain and is noting an acute on chronic exacerbation of this pain due to lumbar radiculopathy. There are no myelopathic signs on today's exam.  Patient reassured and educated on the diagnosis and treatment options. Risks and benefits of treatment and of delaying treatment discussed with patient. Risks discussed include but not limited to: progression of symptoms, worsening pain and functional status, etc.  This note was generated using Dragon medical dictation software. A reasonable effort had been made for proofreading its contents, but spelling mistakes or grammatical errors may still remain. If there are any questions or points of clarification needed please notify my office.  Titrating Gabapentin up to 200mg PO nightly Monitor for sedation, dizziness and any signs of respiratory depression. Avoid taking together with opioid pain medicines and other drugs that depress the central nervous system function. Avoid sudden cessation after prolonged use, due to risk of seizures. Risks and benefits were explained and patient expressed understanding.  Continue PT for BACK pain to help relieve pain and improve function. Stretching, strengthening, ROM, home education and other appropriate interventions. Precautions include fall prevention.  Follow up 4 weeks Consider lumbar TFESI on the LEFT. Brochure provided and all questions answered.  Total time spent on medical discussion: 5 minutes   Patient was advised if the following symptoms develop: chills, fever, loss of bladder control, bowel incontinence or urinary retention, numbness/tingling or weakness is present in upper or lower extremities, to go to the nearest emergency room. This may be a new clinical condition not present at the time of the patient visit that may lead to paralysis and/or death. Patient advised if the above symptoms developed to also call the office immediately to inform us and to go to the nearest emergency room.

## 2024-01-03 ENCOUNTER — APPOINTMENT (OUTPATIENT)
Dept: PHYSICAL MEDICINE AND REHAB | Facility: CLINIC | Age: 89
End: 2024-01-03
Payer: MEDICARE

## 2024-01-03 PROCEDURE — 99213 OFFICE O/P EST LOW 20 MIN: CPT

## 2024-01-03 RX ORDER — METHYLPREDNISOLONE 4 MG/1
4 TABLET ORAL
Qty: 1 | Refills: 0 | Status: ACTIVE | COMMUNITY
Start: 2024-01-03 | End: 1900-01-01

## 2024-01-03 NOTE — REVIEW OF SYSTEMS
[Abdominal Pain] : no abdominal pain [Dysuria] : no dysuria [Negative] : Constitutional [FreeTextEntry9] : improved back pain

## 2024-01-03 NOTE — ASSESSMENT
[FreeTextEntry1] : Ms. 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  Patient reassured and educated on the diagnosis and treatment options. Risks and benefits of treatment and of delaying treatment discussed with patient. Risks discussed include but not limited to: progression of symptoms, worsening pain and functional status, etc.  This note was generated using Dragon medical dictation software. A reasonable effort had been made for proofreading its contents, but spelling mistakes or grammatical errors may still remain. If there are any questions or points of clarification needed please notify my office.  Continue Gabapentin up to 200mg PO nightly for now Monitor for sedation, dizziness and any signs of respiratory depression. Avoid taking together with opioid pain medicines and other drugs that depress the central nervous system function. Avoid sudden cessation after prolonged use, due to risk of seizures. Risks and benefits were explained and patient expressed understanding.  Continue PT for BACK pain to help relieve pain and improve function. Stretching, strengthening, ROM, home education and other appropriate interventions. Precautions include fall prevention.  PRN Medrol 4mg dose pack #21 tablets. Patient directed to follow directions on the blister pack and to complete the entire treatment course. Advised not to take any NSAIDs during of treatment. Denies CKD, CAD, or gastritis. Recommend that if patient develops GI symptoms including abdominal pain, nausea, or vomiting to discontinue use of medication immediately.  Follow up 3 months or upon return from [facility] next visit Will wean off Gabapentin in the next visit  Patient was advised if the following symptoms develop: chills, fever, loss of bladder control, bowel incontinence or urinary retention, numbness/tingling or weakness is present in upper or lower extremities, to go to the nearest emergency room. This may be a new clinical condition not present at the time of the patient visit that may lead to paralysis and/or death. Patient advised if the above symptoms developed to also call the office immediately to inform us and to go to the nearest emergency room.

## 2024-01-03 NOTE — HISTORY OF PRESENT ILLNESS
[FreeTextEntry1] : PRAVIN MORRISON had LEFT GTB and TP with ULT guidance injection on 12/4/23. Today patient is here for follow up. Patient reports greater than 80% reduction in pain as tested by the NRS pain scale. Patient also reports improvement in quality of life. In addition, patient reports improvement of function and ADLs along with a temporary decrease in pain medication use. Pain is 0/10 on NRS at this time and she is very happy. She is continuing PT and taking Gabapentin.  She is leaving on a trip to [place] soon to visit her son.   Denies any new pain, numbness or weakness, bowel/bladder dysfunction, saddle anesthesia, fevers, chills, weight loss, night pain, or night sweats at this time. [Fatigue] : fatigue [Hematuria] : hematuria [Negative] : Psychiatric [Incontinence] : no incontinence [Dysuria] : no dysuria [Hesitancy] : no hesitancy [Nocturia] : no nocturia [Frequency] : no frequency [Impotence] : no impotency [Poor Libido] : libido not poor

## 2024-02-20 ENCOUNTER — APPOINTMENT (OUTPATIENT)
Dept: PHYSICAL MEDICINE AND REHAB | Facility: CLINIC | Age: 89
End: 2024-02-20
Payer: MEDICARE

## 2024-02-20 PROCEDURE — 99214 OFFICE O/P EST MOD 30 MIN: CPT

## 2024-02-20 RX ORDER — GABAPENTIN 100 MG/1
100 CAPSULE ORAL TWICE DAILY
Qty: 180 | Refills: 0 | Status: ACTIVE | COMMUNITY
Start: 2024-02-20 | End: 1900-01-01

## 2024-02-20 NOTE — ASSESSMENT
[FreeTextEntry1] : Ms. 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 There is concern for hardware loosening due to laxity of the joint.  Patient reassured and educated on the diagnosis and treatment options. Risks and benefits of treatment and of delaying treatment discussed with patient. Risks discussed include but not limited to: progression of symptoms, worsening pain and functional status, etc.  This note was generated using Dragon medical dictation software. A reasonable effort had been made for proofreading its contents, but spelling mistakes or grammatical errors may still remain. If there are any questions or points of clarification needed please notify my office.  Continue Gabapentin up to 200mg PO nightly for now Monitor for sedation, dizziness and any signs of respiratory depression. Avoid taking together with opioid pain medicines and other drugs that depress the central nervous system function. Avoid sudden cessation after prolonged use, due to risk of seizures. Risks and benefits were explained and patient expressed understanding.  Continue PT for BACK pain to help relieve pain and improve function. Stretching, strengthening, ROM, home education and other appropriate interventions. Precautions include fall prevention.  XR b/l knees to eval for hardware loosening Voltaren PRN ICE PRN Parking disability pass Follow up 4 weeks Consider genicular nerve blocks  Patient was advised if the following symptoms develop: chills, fever, loss of bladder control, bowel incontinence or urinary retention, numbness/tingling or weakness is present in upper or lower extremities, to go to the nearest emergency room. This may be a new clinical condition not present at the time of the patient visit that may lead to paralysis and/or death. Patient advised if the above symptoms developed to also call the office immediately to inform us and to go to the nearest emergency room.

## 2024-02-20 NOTE — PHYSICAL EXAM
[FreeTextEntry1] : General exam   Constitutional: The patient appears well-developed, well-nourished, and in no apparent distress. Patient is well-groomed.    Skin: The skin is warm and dry, with normal turgor.  Eyes: PERRL.    ENMT: Ears: Hearing is grossly within normal limits.    Neck: Supple: The neck is supple.    Respiratory: Inspection: Breathing unlabored.    Neurologic: Alert and oriented x 3.   Psychiatric: Patient is cooperative and appropriate.  Mood and affect are normal.  Patient's insight is good, and memory and judgment are intact.  BILATERAL KNEE EXAM  APPEARANCE: Healed surgical scars No gross deformity or malalignment No erythema, swelling or ecchymosis Normal temperature to touch No muscle atrophy of the lower extremity No clubbing, cyanosis, swelling or erythema of the lower extremity  TENDERNESS: No tenderness medial joint line No tenderness lateral joint line +palpable effusion +pes anserine tenderness  ROM: Normal A/PROM +pain with flexion, extension of the knee +crepitus Increased laxity  SPECIAL TESTS: Patellofemoral compression test is negative.  Valgus stress test is +.  Varus stress test is +.  Deven test is negative.  Anterior drawer test is negative.  Posterior drawer test is negative.  Patella grinding test is negative.  Lachman test is +.  GAIT: antalgic gait

## 2024-02-20 NOTE — HISTORY OF PRESENT ILLNESS
[FreeTextEntry1] : PRAVIN MORRISON is here for follow up. Since last visit she had been doing with her back and hip pains with PT. However she feels like her knee pains are bothering her now. Her left knee was swollen not too long ago. 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 She is asking about hardware stability. She also cannot walk for more than 50feet without having to stop and rest. She is asking for disability parking pass to be completed today.  She was supposed to go to [place] but did not because her friend's  fell and broke his hip.   Denies any new pain, numbness or weakness, bowel/bladder dysfunction, saddle anesthesia, fevers, chills, weight loss, night pain, or night sweats at this time.

## 2024-02-26 ENCOUNTER — NON-APPOINTMENT (OUTPATIENT)
Age: 89
End: 2024-02-26

## 2024-02-29 ENCOUNTER — APPOINTMENT (OUTPATIENT)
Dept: PHYSICAL MEDICINE AND REHAB | Facility: CLINIC | Age: 89
End: 2024-02-29
Payer: MEDICARE

## 2024-02-29 DIAGNOSIS — M25.562 PAIN IN RIGHT KNEE: ICD-10-CM

## 2024-02-29 DIAGNOSIS — M25.561 PAIN IN RIGHT KNEE: ICD-10-CM

## 2024-02-29 DIAGNOSIS — G89.29 PAIN IN RIGHT KNEE: ICD-10-CM

## 2024-02-29 PROCEDURE — 99441: CPT | Mod: 93

## 2024-02-29 NOTE — HISTORY OF PRESENT ILLNESS
[FreeTextEntry1] : Televisit encounter Patient consented to be evaluated via televisit today using audio Present during today's encounter:  PRAVIN MORRISON and myself Patient was located at home. I was located at: [facility] [place] [address]  PRAVIN MORRISON is here for follow up. Since last visit she had XR of both knees. She had started taking Gabapentin and reports help with pain and sleep. She is very happy with how she feels with it. She would like to do PT for the knees as well as her hips.   Denies any new pain, numbness or weakness, bowel/bladder dysfunction, saddle anesthesia, fevers, chills, weight loss, night pain, or night sweats at this time.

## 2024-02-29 NOTE — ASSESSMENT
[FreeTextEntry1] : Ms. 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 There is concern for hardware loosening due to laxity of the joint.  Patient reassured and educated on the diagnosis and treatment options. Risks and benefits of treatment and of delaying treatment discussed with patient. Risks discussed include but not limited to: progression of symptoms, worsening pain and functional status, etc.  This note was generated using Dragon medical dictation software. A reasonable effort had been made for proofreading its contents, but spelling mistakes or grammatical errors may still remain. If there are any questions or points of clarification needed please notify my office.  Continue Gabapentin up to 200mg PO nightly for now Monitor for sedation, dizziness and any signs of respiratory depression. Avoid taking together with opioid pain medicines and other drugs that depress the central nervous system function. Avoid sudden cessation after prolonged use, due to risk of seizures. Risks and benefits were explained and patient expressed understanding.  Continue PT for BACK and also now KNEE pains to help relieve pain and improve function. Stretching, strengthening, ROM, home education and other appropriate interventions. Precautions include fall prevention.  XR b/l knees reviewed: NO hardware loosening Voltaren PRN ICE PRN Parking disability pass last visit Follow up 2 months Consider genicular nerve blocks in the future  Total time spent on medical discussion: 5 minutes  Patient was advised if the following symptoms develop: chills, fever, loss of bladder control, bowel incontinence or urinary retention, numbness/tingling or weakness is present in upper or lower extremities, to go to the nearest emergency room. This may be a new clinical condition not present at the time of the patient visit that may lead to paralysis and/or death. Patient advised if the above symptoms developed to also call the office immediately to inform us and to go to the nearest emergency room.

## 2024-03-18 ENCOUNTER — APPOINTMENT (OUTPATIENT)
Dept: PHYSICAL MEDICINE AND REHAB | Facility: CLINIC | Age: 89
End: 2024-03-18

## 2024-04-01 ENCOUNTER — APPOINTMENT (OUTPATIENT)
Dept: PHYSICAL MEDICINE AND REHAB | Facility: CLINIC | Age: 89
End: 2024-04-01
Payer: MEDICARE

## 2024-04-01 DIAGNOSIS — R26.81 UNSTEADINESS ON FEET: ICD-10-CM

## 2024-04-01 DIAGNOSIS — M79.18 MYALGIA, OTHER SITE: ICD-10-CM

## 2024-04-01 DIAGNOSIS — M48.062 SPINAL STENOSIS, LUMBAR REGION WITH NEUROGENIC CLAUDICATION: ICD-10-CM

## 2024-04-01 DIAGNOSIS — Z96.653 PRESENCE OF ARTIFICIAL KNEE JOINT, BILATERAL: ICD-10-CM

## 2024-04-01 DIAGNOSIS — G89.29 MYALGIA, OTHER SITE: ICD-10-CM

## 2024-04-01 PROCEDURE — 99213 OFFICE O/P EST LOW 20 MIN: CPT

## 2024-04-01 RX ORDER — GABAPENTIN 100 MG/1
100 CAPSULE ORAL
Qty: 60 | Refills: 1 | Status: ACTIVE | COMMUNITY
Start: 2023-12-20 | End: 1900-01-01

## 2024-04-01 NOTE — ASSESSMENT
[FreeTextEntry1] : Ms. 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 She is reporting exacerbation of her LEFT GTB today.  Patient reassured and educated on the diagnosis and treatment options. Risks and benefits of treatment and of delaying treatment discussed with patient. Risks discussed include but not limited to: progression of symptoms, worsening pain and functional status, etc.  This note was generated using Dragon medical dictation software. A reasonable effort had been made for proofreading its contents, but spelling mistakes or grammatical errors may still remain. If there are any questions or points of clarification needed please notify my office.  Continue Gabapentin up to 200mg PO nightly for now Monitor for sedation, dizziness and any signs of respiratory depression. Avoid taking together with opioid pain medicines and other drugs that depress the central nervous system function. Avoid sudden cessation after prolonged use, due to risk of seizures. Risks and benefits were explained and patient expressed understanding.  Continue PT for BACK and also now KNEE pains to help relieve pain and improve function. Stretching, strengthening, ROM, home education and other appropriate interventions. Precautions include fall prevention.  Schedule for LEFT GTB injection with ULT in 1 week  Patient was advised if the following symptoms develop: chills, fever, loss of bladder control, bowel incontinence or urinary retention, numbness/tingling or weakness is present in upper or lower extremities, to go to the nearest emergency room. This may be a new clinical condition not present at the time of the patient visit that may lead to paralysis and/or death. Patient advised if the above symptoms developed to also call the office immediately to inform us and to go to the nearest emergency room.

## 2024-04-01 NOTE — HISTORY OF PRESENT ILLNESS
[FreeTextEntry1] : PRAVIN MORRISON is here for follow up with her daughter. Since last visit she has been doing PT and taking Gabapentin with good results. She was diagnosed with Trendelenburg gait by the therapist. She is also noting flare up of her LEFT outer hip pain and is asking to repeat the injection. Pain is 10/10 on NRS. Knee pains are much improved at this time.   Denies any new pain, numbness or weakness, bowel/bladder dysfunction, saddle anesthesia, fevers, chills, weight loss, night pain, or night sweats at this time.

## 2024-04-01 NOTE — PHYSICAL EXAM
[FreeTextEntry1] : General exam   Constitutional: The patient appears well-developed, well-nourished, and in no apparent distress. Patient is well-groomed.    Skin: The skin is warm and dry, with normal turgor.  Eyes: PERRL.    ENMT: Ears: Hearing is grossly within normal limits.    Neck: Supple: The neck is supple.    Respiratory: Inspection: Breathing unlabored.    Neurologic: Alert and oriented x 3.   Psychiatric: Patient is cooperative and appropriate.  Mood and affect are normal.  Patient's insight is good, and memory and judgment are intact.  Lumbar Skin c/d/i without any erythema, swelling, effusion  + LEFT GTB tenderness Antalgic, slow Trendelenburg gait

## 2024-04-02 ENCOUNTER — APPOINTMENT (OUTPATIENT)
Dept: PHYSICAL MEDICINE AND REHAB | Facility: CLINIC | Age: 89
End: 2024-04-02
Payer: MEDICARE

## 2024-04-02 PROCEDURE — 20611 DRAIN/INJ JOINT/BURSA W/US: CPT | Mod: LT

## 2024-04-02 NOTE — PROCEDURE
[de-identified] : PROCEDURE: LEFT Greater Trochanteric Injection with Ultrasound Guidance   Diagnosis:  Greater trochanteric pain syndrome   Injectate: A total of 5 mL consisting of 1 ml Methylprednisolone (40mg/mL) and 4 mL 0.25% Bupivacaine   Approach: in-plane dorsal to ventral   Complications: None   EBL: None   Findings: The [] GTB was examined under ultrasound using a linear array transducer.  There was heterogeneity of the gluteus medius and gluteus minimus consistent with GTB pain syndrome.   Procedure:  Risks, benefits and alternatives were discussed, all questions were answered, and the patient signed informed consent. Risks include but are not limited to bleeding, infection, worsening pain, nerve damage, scar formation.   The skin was then cleaned with chloraprep. A small skin wheal was made with a 25 G 1.5 inch needle with 5ml 1% PF Lidocaine without Epinephrine.  A 25G 3.5 inch spinal needle was then inserted in-plane.  The needle was well visualized and guided to the bursal soft tissue planes. After negative aspiration, the injectate was administered within planes and not into the tendons. The patient tolerated the procedure well without complication.   Patient had improved ROM and pain relief after injection without any weakness or numbness.   If there are any complications, the patient was instructed to call us.  The patient is to follow-up with us in one to two weeks.

## 2024-04-16 DIAGNOSIS — M25.559 PAIN IN UNSPECIFIED HIP: ICD-10-CM

## 2024-05-10 ENCOUNTER — APPOINTMENT (OUTPATIENT)
Dept: ORTHOPEDIC SURGERY | Facility: CLINIC | Age: 89
End: 2024-05-10
Payer: MEDICARE

## 2024-05-10 ENCOUNTER — NON-APPOINTMENT (OUTPATIENT)
Age: 89
End: 2024-05-10

## 2024-05-10 VITALS — WEIGHT: 139 LBS | HEIGHT: 62 IN | BODY MASS INDEX: 25.58 KG/M2

## 2024-05-10 DIAGNOSIS — G89.29 PAIN IN LEFT HIP: ICD-10-CM

## 2024-05-10 DIAGNOSIS — M70.62 TROCHANTERIC BURSITIS, LEFT HIP: ICD-10-CM

## 2024-05-10 DIAGNOSIS — M25.552 PAIN IN LEFT HIP: ICD-10-CM

## 2024-05-10 PROCEDURE — 20610 DRAIN/INJ JOINT/BURSA W/O US: CPT | Mod: LT

## 2024-05-10 PROCEDURE — 99204 OFFICE O/P NEW MOD 45 MIN: CPT | Mod: 25

## 2024-05-10 NOTE — HISTORY OF PRESENT ILLNESS
[de-identified] : 88 year old female presentign with left hip pain since Thanksgiving of last year. She has received 2 injections in her left hip bursa, the first one relieved pain well the second one was less effective.

## 2024-05-10 NOTE — DISCUSSION/SUMMARY
[de-identified] : Discussed findings of today's exam and possible causes of patient's pain.  Educated patient on their most probable diagnosis of chronic intermittent lateral left hip pain with recent atraumatic exacerbation due to recurrence of trochanteric bursitis and peritrochanteric tendinitis.  Reviewed possible courses of treatment, and we collaboratively decided best course of treatment at this time will include conservative management.  Patient has had 2 cortisone injections for this issue, 1 worked very well, the other did not work for as long.  She is currently undergoing physical therapy.  The patient's physical therapist recommended she consider hyaluronic acid gel injections.  Patient made today's appointment to discuss those.  Educated the patient and her son that her pain is lateral in the hip, it is not related to her groin, she does not appear to have primary etiology of hip osteoarthritis.  She is advised that gel injections are to treat osteoarthritis, we do not perform gel injections for trochanteric bursitis or tendinitis.  Patient stated understanding and appreciation of the explanation.  However, she is still having persistent lateral hip pain and is at a plateau with physical therapy.  We discussed various treatment options as well as associated risk/benefits/alternatives and patient elected to proceed with repeat cortisone injection today (see procedure note).  Informed the patient that the numbing medicine in today's injection will last for about 4-6 hours. The steroid that was injected will start to work in 1 to 2 days, peak at 1-2 weeks, and may last up to 1-2 months.  Patient is advised that there is a limit of 3 cortisone injections per side per 12-month period.  Patient will continue her course of physical therapy.  Follow up as needed.  Patient and adult son appreciate and agree with current plan.  This note was generated using dragon medical dictation software.  A reasonable effort has been made for proofreading its contents, but typos may still remain.  If there are any questions or points of clarification needed please notify my office.

## 2024-05-10 NOTE — PROCEDURE
[de-identified] : Injection: Left  Hip. Indication: Trochanteric Bursitis.  A discussion was had with the patient regarding this procedure and all questions were answered. All risks, benefits and alternatives were discussed. These included but were not limited to bleeding, infection, and allergic reaction. A timeout was done to ensure correct side and patient agreed to the procedure.  A Afognak danis was created on the skin utilizing a plastic needle cap to danis the anticipated point of entry. Alcohol was used to clean the skin, and Betadine was used to sterilize and prep the area in the lateral aspect of the hip. A timeout was done to ensure correct side and pt agreed to the procedure.  Ethyl chloride spray was then used as a topical anesthetic. A 25-gauge needle was used to inject 2cc of 0.25% bupivacaine and 1cc of 40mg/ml methylprednisolone into the greater trochanteric bursa using a needling technique. A sterile bandage was then applied. The patient tolerated the procedure well and there were no complications

## 2024-05-10 NOTE — PHYSICAL EXAM
[de-identified] : Constitutional: Well-nourished, well-developed, No acute distress Respiratory:  Good respiratory effort, no SOB Psychiatric: Pleasant and normal affect, alert and oriented x3 Musculoskeletal: normal except where as noted in regional exam   Left Hip:   APPEARANCE: no marked deformities, no swelling or malalignment POSITIVE TENDERNESS: Greater trochanter, and mild distally along ITB NONTENDER: TFL, gluteal region, ischium/proximal hamstring region, hip flexor region, sartorius and pubic symphysis. ROM: full & painless. RESISTIVE TESTING: Mild pain in lateral hip with resisted abduction, painless resisted ER/IR/SLR/adduction. SPECIAL TESTS: neg JOSHUA/FADIR, painless loaded flexion & scouring

## 2024-05-21 ENCOUNTER — APPOINTMENT (OUTPATIENT)
Dept: ENDOCRINOLOGY | Facility: CLINIC | Age: 89
End: 2024-05-21
Payer: MEDICARE

## 2024-05-21 ENCOUNTER — NON-APPOINTMENT (OUTPATIENT)
Age: 89
End: 2024-05-21

## 2024-05-21 VITALS
OXYGEN SATURATION: 98 % | BODY MASS INDEX: 24.84 KG/M2 | WEIGHT: 135 LBS | HEART RATE: 75 BPM | SYSTOLIC BLOOD PRESSURE: 110 MMHG | HEIGHT: 62 IN | DIASTOLIC BLOOD PRESSURE: 78 MMHG

## 2024-05-21 DIAGNOSIS — E11.9 TYPE 2 DIABETES MELLITUS W/OUT COMPLICATIONS: ICD-10-CM

## 2024-05-21 PROCEDURE — G2211 COMPLEX E/M VISIT ADD ON: CPT

## 2024-05-21 PROCEDURE — 99215 OFFICE O/P EST HI 40 MIN: CPT

## 2024-05-21 RX ORDER — BLOOD-GLUCOSE SENSOR
EACH MISCELLANEOUS
Qty: 2 | Refills: 11 | Status: ACTIVE | COMMUNITY
Start: 2024-05-21 | End: 1900-01-01

## 2024-05-21 RX ORDER — BLOOD SUGAR DIAGNOSTIC
STRIP MISCELLANEOUS DAILY
Qty: 25 | Refills: 11 | Status: ACTIVE | COMMUNITY
Start: 2024-05-21 | End: 1900-01-01

## 2024-05-21 RX ORDER — LANCETS
EACH MISCELLANEOUS
Qty: 100 | Refills: 3 | Status: ACTIVE | COMMUNITY
Start: 2024-05-21 | End: 1900-01-01

## 2024-05-21 NOTE — HISTORY OF PRESENT ILLNESS
[FreeTextEntry1] : May 21, 2024    in person   PCP: Dr. Yolanda Villa  <- Dr. Amado Ng             Kidney - Dr. Nehemiah Hughes at [facility] for elev. creat.             Gyn: Dr. Fay             Card: Dr. George Dunn ([place]) -> Rebecca Giles [facility] in [place]:   [phone] (Office)   [phone] (Fax)             Neuro: Dr. Crook for tremors 4 herniated discs      had MRI brain as well             Ortho:  Dr. Zaidi   for L achilles tendonitis 2/2022                 Eyes:  Dr. Izaguirre ->  Dr. Valenzuela in [place]    floaters            .            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 - Medullary                 Type 2 diabetes A1c 6.7% ~ 2009 4/2022: A1c 6.0%             Elevated calcium - ?resolved off of supps             (elev. creatinine - Dr. 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  Left with requirement for low dose levothyroxine:  25 mcg daily.  87 yo. Her  passed away in August 2021 and she lost weight and by 4/2022 A1c at office of Dr. Giles was 6.0%  Has a cardiologist in [place] (-P Dr. Rebecca Vu "I feel wonderful"...but L hip bursitis and tendinitis started 6 weeks ago, required steroids and now A1c is up to 6.6% and she is concerned so increased metformin. She has a meter at home, but does not use it. Plans a trip.  Today instructed in use of Contour Next (her Freestyle Lite - never used - needs new battery) Instructed in USE of Libre3        Apr 20, 2022      in person   PCP: Dr. Yolanda Villa  <- Dr. Amado Ng             Kidney - Dr. Nehemiah Hughes at [facility] for elev. creat.             Gyn: Dr. Fay             Card: Dr. George Dunn ([place]) -> Rebecca Giles [facility] in [place]:   [phone] (Office)   [phone] (Fax)             Neuro: Dr. Crook for tremors 4 herniated discs      had MRI brain as well             Ortho:  Dr. Zaidi   for L achilles tendonitis 2/2022                 Eyes:  Dr. Izaguirre ->  Dr. Valenzuela in [place]    floaters            .            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 - Medullary                 Type 2 diabetes A1c 6.7% ~ 2009 4/2022: A1c 6.0%             Elevated calcium - ?resolved off of supps             (elev. creatinine - Dr. 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  Left with requirement for low dose levothyroxine:  25 mcg daily.  87 yo. Her  passed away in August 2021 and she lost weight and by 4/2022 A1c at office of Dr. Giles was 6.0%  creatinine 1.38 *   : : Constitutional:  Alert, well nourished, healthy appearance, no acute distress  Eyes:  No proptosis, no stare Thyroid:  Normal to palpation Pulmonary:  No respiratory distress, no accessory muscle use; normal rate and effort Cardiac:  Normal rate Vascular:  Endocrine:  No stigmata of Cushing's Syndrome Musculoskeletal:  Normal gait, no involuntary movements Neurology:  No tremors Affect/Mood/Psych:  Oriented x 3; normal affect, normal insight/judgement, normal mood   Imp:   She needs special sneakers for achilles tendonits and she will discuss with Dr. Zaidi A1c indicates diabetes is under excellent control.  Plan:  TFTs. LT4 and metformin renewed. ROV ~8 months  .       August 20, 2019     PCP: Dr. Yolanda Villa  <- Dr. Amado Ng             Kidney - Dr. Nehemiah Hughes at [facility] for elev. creat.             Gyn: Dr. Fay             Card: Dr. George Dunn ([place])              [phone] (Office)             [phone] (Fax)             Neuro: Dr. Crook for tremors 4 herniated discs              had MRI brain as well            .            CC: Silent thyroiditis - 2013 (now euthyroid)             12/13/2014 [facility] - no nodule             Grand daughter developed thyroid cancer. - Medullary                 Type 2 diabetes A1c 6.7% ~ 2009             Elevated calcium - ?resolved off of supps             (elev. creatinine - Dr. 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  Left with requirement for low dose levothyroxine:  25 mcg daily.  Visits today regarding diabetes.   Taking metformin 500 mg ER, two daily  Recently underwent TAVR at [facility] very successfully.    Has a meter at home, but never uses it.    Impression:  Mild hypothyrodiism controlled. Labs today will show if BS also controlled.     Previous notes from [employer] appended below.      July 11, 2018            .            PCP: Dr. Amado Ng             Kidney - Dr. Nehemiah Hughes at [facility] for elev. creat.             Gyn: Dr. Fay             Card: Dr. George Dunn ([place])              [phone] (Office)             [phone] (Fax)             Neuro: Dr. Crook for tremors 4 herniated discs              had MRI brain as well            .            CC: Silent thyroiditis - 2013 (now euthyroid)             12/13/2014 [facility] - no nodule             Grand daughter developed thyroid cancer.             Type 2 diabetes A1c 6.7%             Elevated calcium - ?resolved off of supplements             (elev. creatinine - Dr. Hughes)             (osteoarthritis)            .            On Synthroid 25 mcg daily and            metformin 1000 mg daily (Dougie Rudolph asked if ER better)            .            Kindly brings labs ([place]) from            Dr. George Dunn/(Nico Banks) [facility] at [address] [place] [address] in [place]. phone: [phone] which include:            .            glucose 115 mg/dl            creatinine 1.44            calcium 10.1            HbA1c 6.1 %            .            She developed SOB, switched from HCTZ to furosemide and attributes this to improvement of A1c down to 6.1%            She also feels much improved on the furosemide.             .            Impression: Diabetes transitioned to prediabetes.            A1c excellent.            .            .            ==            .            December 12, 2017            .            PCP: Dr. Amado Ng             Kidney - Dr. Nehemiah Hughes at [facility]             Gyn: Dr. Fay             Card: Dr. George Dunn ([place])              [phone] (Office)             [phone] (Fax)            .            CC: Silent thyroiditis - 2013 (now euthyroid)             Grand daughter developed thyroid cancer.             Type 2 diabetes             Elevated calcium - ?resolved off of supps             (elev. creatinine - Dr. Hughes)             (osteoarthritis)            .            Last here about 2 1/2 hears ago.            Last available blood tests ([facility]) from            4/29/2015            include                        cret1.53             L shoulder replacement.            Herniated disc cervical spine            Twitching "like Dr. Barbosa"             -> gabapentin with resolution of the twitching.            Her main symptom today is fatigue.            She reports that she has been told of early aortic stenosis.            Recently switched from HCTZ to furosemide b/o LE edema.            .            81 yo accompanied by her friend, Dougie.            They will be traveling to [place] in the near future.             .            Says A1c 6.3 %            .            Impression: Gr 2/6 KE. No edema.            Thyroid normal to palpation. Ultrasound thyroid at [facility]            12/13/2014 showed "No nodule...hertogeneous echotexture...."             .            Plan: Updated TFTs today.            Call here for results in one-two weeks.            ROV one year.             .            .            ==            .            May 19, 2015            .            PCP: Dr. Amado Ng             Kidney - Dr. Nehemiah Hughes at [facility]             Gyn: Dr. Fay             Card: Dr. George Dunn ([place])              [phone] (Office)             [phone] (Fax)            .            CC: Silent thyroiditis - 2013             DM2             Elevated calcium             (elev. creatinine - Dr. Hughes)             (osteoarthritis)            .            Friends with Dougie Rudolph who helps with planting and building            furniture. Returns today b/o history of thyroiditis and diabetes.            .            Plans R total knee at [facility] and would like cardiology evaluation before that. Has recently noted some SCHRADER.            .            Also notes increased fatigue and twitching.            Recent [facility] tests show                        calcium 9,5 **** off of supp            BUN 30            creatinine 1.53 (last one was 1.23)            B12 40            TSH 2.85 - on levothyroxine 25 mcg daily            25 OH vit D 31             .            Dec 2014 Ultrasound thyroid - OK            .            Impression: BS in good range, thyroid in good range.            Elevated calcium has resolved off of supplements.            .            Plan: I told her that I agreed that cardiology evaluation is            prudent before next knee surgery.            .            ROV October after labs.            .            ==            .            ==            Dec 16, 2014            PCP: Dr. Ng             Kidney - Dr. Nehemiah Hughes at [facility]             Gyn: Dr. Fay            .            CC: Silent thyroiditis - 2013             DM2             Elevated calcium             (elev. creatinine - Dr. Hughes)             (osteoarthritis)            .            Husb a [employer].             Former , but has herniated disk            Daughter in law recently had CVA            .            # TSH in August on levothyroxine 25 mcg/d normal at 3.0            .            # Calcium on Dec 10 normal at 9.6             PTH 27             1,25 di OH vit D 77.6 (10-75)              25 OH vit D 32.              ACE 31             Immunofixation blood - neg            .            # DM2 - Hemoglobine A1c 6.2%              Urine microalbumin normal a 3.3              mg/dl             Has meter at home for testing, fasting and after meals.             .            # Recent creatinine 1.32            .            Impression: All in all, doing very well. Hypothyroidism controlled.            .            Plan: Keep f/u appt to ophto - Dr. Izaguirre. No longer needs colonsocopies.             .            .            ====            Oct 15, 2014            PCP: Dr. Hernandez Hughes at [facility] Ne[facility]            CC: DM2             Silent thyroiditis             Elevated calcium             (elev creatinine - saw Dr. Hughes)            .            Blood tests done by Dr. Ng on August 26 showed             mg/dl            BUN 26            creatinine 1.35            TSH 3.0            HbA1c 6.2%            calcium 9.8 alb 4.2            .            Remains on levothyroxine 25 mcg daily.            .            Impression: Reason for elevated calcium not clear.              followed at [facility] for kidney cancer, Hep C (after transfusion).            .            ==            April 22, 2014            PCP: Dr. Ng            CC: DM2, thyroiditis, elevated calcium            .            .            Enjoyedd [place]            Will go to [facility]            CC: today = fatigue            .            Impression: Fingerstick BS highest at 176 mg/dl            On metformin 500 mg, two in PM per Dr. Sanders            FBS at lab 112 mg/dl and A1c 6.2%            .            Impresssion: Thyroiditis resolved. BS good.            Plan: F/U ultrasound thyroid October.            Continue weekly fingerstick BS            F/U to Dr. Ng.            .            .=========            March 4, 2014            PCP: Dr. Ng            CC: DM2; thyroiditis; elevated calcium            .            Had a good time in [place] and she and I chatted while she was there.            .            # She reports BS in good range, but does not test very often. Encouraged her to test at least once a week.            .            # Hyperthyroidism associated with very low uptake on scan, followed by hypothyroidism. Most recent TFTs are now back to normal. Continued surveillance and a f/u ultrasound thyroid will be prudent.            .            # Labs also show some elevation of calcium and elevated albumin ?volume depleted at time of test. Will follow up on calcium, albumin, BUN, creatinine before next visit.            .            =====            October 30, 2013            PCP: Dr. Ng            CC: DM2, hyperthyroid            .            Thyroid scan shows no uptake. T4 14            Imp: Thyroiditis.            Plan: PRN propranol; TFTs every month until return visit (she will be in [place] for a while).            .            =========            Oct 23, 2013            PCP: Dr. Amado Ng            CC: DM2, hyperthyroid            .            On Lamasil for fungal infection of nail, per a relative and has been advised to have LFTs checked.            Dr. Sanders had started her on metformin, which she still takes.            Went for sonogram of thyroid, small nodule.            Thyroid scan was supposed to be performed today, but I-123 was not available.            Imp: May well have thyroiditis.            Plan: For shakiness, propranolol 5 mg BID prn.            Scan rescheduled and ROV after that.            Continue to monitor some PC BS.            .            ========            October 17, 2013            PCP: Dr. Amado Ng fax [phone]            CC: DM2            .            On metformin 1000 mg BID            .            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             # 2009 lost weight and was diagnosed with diabetes. Now takes metformin 1000 mg daily. Did well until April and Dr. Ng found that TSH was elevated. Now very fatigued. Sleeps more.             Saw Dr. Hughes at - for elevated creat            FHx thyroid cancer.            Imp: Went from hypo to hyperthyroid after L-thyroxine.            Plan: Labs/scan/sono ROV 1 week.

## 2024-12-13 ENCOUNTER — APPOINTMENT (OUTPATIENT)
Dept: ENDOCRINOLOGY | Facility: CLINIC | Age: 89
End: 2024-12-13
Payer: MEDICARE

## 2024-12-13 VITALS
HEART RATE: 75 BPM | DIASTOLIC BLOOD PRESSURE: 60 MMHG | SYSTOLIC BLOOD PRESSURE: 124 MMHG | BODY MASS INDEX: 24.29 KG/M2 | WEIGHT: 132 LBS | OXYGEN SATURATION: 98 % | HEIGHT: 62 IN

## 2024-12-13 DIAGNOSIS — E11.9 TYPE 2 DIABETES MELLITUS W/OUT COMPLICATIONS: ICD-10-CM

## 2024-12-13 DIAGNOSIS — E03.9 HYPOTHYROIDISM, UNSPECIFIED: ICD-10-CM

## 2024-12-13 PROCEDURE — 99215 OFFICE O/P EST HI 40 MIN: CPT

## 2024-12-13 RX ORDER — GABAPENTIN 300 MG/1
300 CAPSULE ORAL
Refills: 0 | Status: ACTIVE | COMMUNITY

## 2024-12-16 ENCOUNTER — APPOINTMENT (OUTPATIENT)
Dept: ENDOCRINOLOGY | Facility: CLINIC | Age: 89
End: 2024-12-16

## 2025-01-09 ENCOUNTER — APPOINTMENT (OUTPATIENT)
Dept: ORTHOPEDIC SURGERY | Facility: CLINIC | Age: 89
End: 2025-01-09
Payer: MEDICARE

## 2025-01-09 DIAGNOSIS — M70.62 TROCHANTERIC BURSITIS, LEFT HIP: ICD-10-CM

## 2025-01-09 PROCEDURE — 20610 DRAIN/INJ JOINT/BURSA W/O US: CPT | Mod: LT

## 2025-01-09 PROCEDURE — 99214 OFFICE O/P EST MOD 30 MIN: CPT | Mod: 25

## 2025-08-18 ENCOUNTER — NON-APPOINTMENT (OUTPATIENT)
Age: 89
End: 2025-08-18

## 2025-08-18 ENCOUNTER — APPOINTMENT (OUTPATIENT)
Dept: ENDOCRINOLOGY | Facility: CLINIC | Age: 89
End: 2025-08-18
Payer: MEDICARE

## 2025-08-18 VITALS
OXYGEN SATURATION: 98 % | BODY MASS INDEX: 24.29 KG/M2 | SYSTOLIC BLOOD PRESSURE: 114 MMHG | HEART RATE: 89 BPM | WEIGHT: 132 LBS | DIASTOLIC BLOOD PRESSURE: 66 MMHG | HEIGHT: 62 IN

## 2025-08-18 DIAGNOSIS — M81.0 AGE-RELATED OSTEOPOROSIS W/OUT CURRENT PATHOLOGICAL FRACTURE: ICD-10-CM

## 2025-08-18 DIAGNOSIS — E03.9 HYPOTHYROIDISM, UNSPECIFIED: ICD-10-CM

## 2025-08-18 DIAGNOSIS — E11.9 TYPE 2 DIABETES MELLITUS W/OUT COMPLICATIONS: ICD-10-CM

## 2025-08-18 PROCEDURE — 99215 OFFICE O/P EST HI 40 MIN: CPT

## 2025-08-18 PROCEDURE — 36415 COLL VENOUS BLD VENIPUNCTURE: CPT

## 2025-08-18 PROCEDURE — G2211 COMPLEX E/M VISIT ADD ON: CPT

## 2025-08-19 LAB
ANION GAP SERPL CALC-SCNC: 16 MMOL/L
BUN SERPL-MCNC: 27 MG/DL
CALCIUM SERPL-MCNC: 10.9 MG/DL
CHLORIDE SERPL-SCNC: 101 MMOL/L
CO2 SERPL-SCNC: 25 MMOL/L
CREAT SERPL-MCNC: 1.51 MG/DL
EGFRCR SERPLBLD CKD-EPI 2021: 33 ML/MIN/1.73M2
ESTIMATED AVERAGE GLUCOSE: 128 MG/DL
GLUCOSE SERPL-MCNC: 106 MG/DL
HBA1C MFR BLD HPLC: 6.1 %
M PROTEIN SPEC IFE-MCNC: NORMAL
POTASSIUM SERPL-SCNC: 4.5 MMOL/L
SODIUM SERPL-SCNC: 142 MMOL/L
T4 SERPL-MCNC: 7.4 UG/DL
TSH SERPL-ACNC: 2.3 UIU/ML

## 2025-08-25 DIAGNOSIS — E83.52 HYPERCALCEMIA: ICD-10-CM

## 2025-08-25 DIAGNOSIS — D86.9 SARCOIDOSIS, UNSPECIFIED: ICD-10-CM

## 2025-08-25 DIAGNOSIS — M81.8 OTHER OSTEOPOROSIS W/OUT CURRENT PATHOLOGICAL FRACTURE: ICD-10-CM

## 2025-08-30 ENCOUNTER — RESULT REVIEW (OUTPATIENT)
Age: 89
End: 2025-08-30